# Patient Record
Sex: FEMALE | Race: WHITE | NOT HISPANIC OR LATINO | Employment: STUDENT | URBAN - METROPOLITAN AREA
[De-identification: names, ages, dates, MRNs, and addresses within clinical notes are randomized per-mention and may not be internally consistent; named-entity substitution may affect disease eponyms.]

---
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
## 2022-12-07 ENCOUNTER — OFFICE VISIT (OUTPATIENT)
Dept: OBGYN CLINIC | Facility: CLINIC | Age: 22
End: 2022-12-07

## 2022-12-07 VITALS
HEIGHT: 62 IN | WEIGHT: 230.2 LBS | HEART RATE: 116 BPM | BODY MASS INDEX: 42.36 KG/M2 | DIASTOLIC BLOOD PRESSURE: 127 MMHG | SYSTOLIC BLOOD PRESSURE: 159 MMHG

## 2022-12-07 DIAGNOSIS — Z30.09 BIRTH CONTROL COUNSELING: ICD-10-CM

## 2022-12-07 DIAGNOSIS — Z12.39 ENCOUNTER FOR BREAST CANCER SCREENING USING NON-MAMMOGRAM MODALITY: ICD-10-CM

## 2022-12-07 DIAGNOSIS — Z01.419 WOMEN'S ANNUAL ROUTINE GYNECOLOGICAL EXAMINATION: Primary | ICD-10-CM

## 2022-12-07 DIAGNOSIS — Z11.3 SCREEN FOR STD (SEXUALLY TRANSMITTED DISEASE): ICD-10-CM

## 2022-12-07 DIAGNOSIS — Z59.41 FOOD INSECURITY: ICD-10-CM

## 2022-12-07 DIAGNOSIS — Z59.9 FINANCIAL DIFFICULTIES: ICD-10-CM

## 2022-12-07 SDOH — ECONOMIC STABILITY - INCOME SECURITY: PROBLEM RELATED TO HOUSING AND ECONOMIC CIRCUMSTANCES, UNSPECIFIED: Z59.9

## 2022-12-07 SDOH — ECONOMIC STABILITY - FOOD INSECURITY: FOOD INSECURITY: Z59.41

## 2022-12-07 NOTE — PROGRESS NOTES
Javier Currie is a 23 y o  female who presents today for a new patient annual GYN exam  She has not yet had her first pap smear due to age  She has received complete HPV vaccine series  She reports she was diagnosed with PCOS at the age of 17 after a few years of oligomenorrhea, hirsutism and thinning of scalp hair  She reports menses over the past few months have been very regular, typically menses are every few months  Patient's last menstrual period was 2022 (approximate)  She reports she became sexually active for the first time a few months ago and she would like contraception  Her general medical history has been reviewed and she reports it as follows:    Past Medical History:   Diagnosis Date   • ADHD    • Anxiety    • Asthma    • Depression    • Migraine    • PCOS (polycystic ovarian syndrome) 2020     Past Surgical History:   Procedure Laterality Date   • ABDOMINAL WALL SURGERY      as a kid     OB History        0    Para   0    Term   0       0    AB   0    Living   0       SAB   0    IAB   0    Ectopic   0    Multiple   0    Live Births   0               Social History     Tobacco Use   • Smoking status: Never   • Smokeless tobacco: Never   Vaping Use   • Vaping Use: Never used   Substance Use Topics   • Alcohol use: Yes     Comment: Ocassionaly   • Drug use: Yes     Types: Marijuana     Comment: once a week     Social History     Substance and Sexual Activity   Sexual Activity Yes   • Partners: Male   • Birth control/protection: Condom     Cancer-related family history is negative for Breast cancer, Colon cancer, and Ovarian cancer      Current Outpatient Medications   Medication Instructions   • albuterol (PROVENTIL HFA,VENTOLIN HFA) 90 mcg/act inhaler 2 puffs, Inhalation, Every 6 hours PRN   • buPROPion (WELLBUTRIN XL) 150 mg 24 hr tablet TAKE 1 TABLET BY MOUTH EVERY DAY IN THE MORNING   • sertraline (ZOLOFT) 50 mg tablet 1 tablet, Oral, Daily       Review of Systems:  Review of Systems   Constitutional: Negative  Gastrointestinal: Negative  Genitourinary: Negative  Skin: Negative  Physical Exam:  BP (!) 159/127 (BP Location: Right arm, Patient Position: Sitting, Cuff Size: Adult)   Pulse (!) 116   Ht 5' 2" (1 575 m)   Wt 104 kg (230 lb 3 2 oz)   LMP 11/17/2022 (Approximate)   BMI 42 10 kg/m²   Physical Exam  Constitutional:       General: She is not in acute distress  Appearance: Normal appearance  Genitourinary:      Vulva and bladder normal    Breasts:     Right: No mass, nipple discharge or skin change  Left: No mass, nipple discharge or skin change  Cardiovascular:      Rate and Rhythm: Normal rate and regular rhythm  Pulmonary:      Effort: Pulmonary effort is normal       Breath sounds: Normal breath sounds  Abdominal:      General: Abdomen is flat  Palpations: Abdomen is soft  Musculoskeletal:      Cervical back: Neck supple  Neurological:      Mental Status: She is alert  Skin:     General: Skin is warm and dry  Psychiatric:         Mood and Affect: Mood is anxious  Behavior: Behavior normal    Vitals reviewed  Assessment/Plan:   1  Normal well-woman GYN exam   2  Cervical cancer screening:  Discussed pap screenings to start at age 26    1  STD screening:  Patient declines  4  Breast cancer screening:  Normal breast exam   Reviewed breast self-awareness  5  Depression Screening: Patient's depression screening was assessed with a PHQ-9 score was 11  Continue regular follow-up with their psychologist/therapist/psychiatrist who is managing their mental health condition(s)  Referral placed for  consultation  6  BMI Counseling: Body mass index is 42 1 kg/m²  Discussed the patient's BMI with her   The BMI is above normal  Nutrition recommendations include reducing portion sizes, decreasing overall calorie intake, 3-5 servings of fruits/vegetables daily, consuming healthier snacks, moderation in carbohydrate intake and increasing intake of lean protein  Exercise recommendations include moderate aerobic physical activity for 150 minutes/week and exercising 3-5 times per week  7  Contraception:  Education given regarding options for contraception, including barrier methods, injectable contraception, IUD placement, oral contraceptives, nuvaring, nexplanon and birth control patch  She would like LARC  Reviewed options  She would like to have a Mirena  Reviewed insertion/removal procedure, risks/benefits and expected bleeding patterns  Declines pre-procedure cytotec  Will use OTC Motrin  Stressed importance of condom use until procedure  8  Discussed concern for elevated BP  She reports extreme anxiety  Repeat BP not improved  Will schedule follow up with PCP  9  Return to office for IUD insertion  Reviewed with patient that [facility], but that they will not necessarily be reviewed by me immediately  Explained that I will review results at my earliest opportunity and contact patient appropriately

## 2022-12-08 ENCOUNTER — PATIENT OUTREACH (OUTPATIENT)
Dept: OBGYN CLINIC | Facility: CLINIC | Age: 22
End: 2022-12-08

## 2022-12-14 ENCOUNTER — PATIENT OUTREACH (OUTPATIENT)
Dept: OBGYN CLINIC | Facility: CLINIC | Age: 22
End: 2022-12-14

## 2022-12-14 ENCOUNTER — TELEPHONE (OUTPATIENT)
Dept: OBGYN CLINIC | Facility: CLINIC | Age: 22
End: 2022-12-14

## 2022-12-14 NOTE — TELEPHONE ENCOUNTER
Lvm for patient in regards to the specialty pharmacy trying to reach out in regards to her Mirena device claim  She would need to update her coordination of benefits with her insurance to process the claim  Given office call back number if needed

## 2022-12-21 ENCOUNTER — PATIENT OUTREACH (OUTPATIENT)
Dept: OBGYN CLINIC | Facility: CLINIC | Age: 22
End: 2022-12-21

## 2023-01-27 ENCOUNTER — NURSE TRIAGE (OUTPATIENT)
Dept: OTHER | Facility: OTHER | Age: 23
End: 2023-01-27

## 2023-01-27 NOTE — TELEPHONE ENCOUNTER
Reason for Disposition  • [1] Caller has URGENT question AND [2] triager unable to answer question    Answer Assessment - Initial Assessment Questions  1  SYMPTOM: "What's the main symptom you're concerned about?" (e g , pain, fever, vomiting)  Pain  2  ONSET: "When did pain  start?"  Today   3  SURGERY: "What surgery was performed?"  4 wisdom teeth removed   4  DATE of SURGERY: "When was surgery performed?"    1/27/2023  5  ANESTHESIA: " What type of anesthesia did you have?" (e g , general, spinal, epidural, local)   N/A  6  PAIN: "Is there any pain?" If Yes, ask: "How bad is it?"  (Scale 1-10; or mild, moderate, severe)  6/10  7  FEVER: "Do you have a fever?" If Yes, ask: "What is your temperature, how was it measured, and when did it start?"  Denies  8  VOMITING: "Is there any vomiting?" If yes, ask: "How many times?"  Denies  9  BLEEDING: "Is there any bleeding?" If Yes, ask: "How much?" and "Where?"  Denies   10   OTHER SYMPTOMS: "Do you have any other symptoms?" (e g , drainage from wound, painful urination, constipation)  Denies    Reported she took the ibuprofen about 30 mins ago    Protocols used: POST-OP SYMPTOMS AND QUESTIONS-[facility]

## 2023-01-27 NOTE — TELEPHONE ENCOUNTER
Regarding: OMS/ Medication dispensing Issues  ----- Message from Loco Parks sent at 1/27/2023  4:04 PM EST -----  "My niece had oral surgery and pain meds were sent to her [facility] pharmacy, But, the pharmacy just informed me that there's a shortage of this drug  I need a different med to be sent to her or for her prescription to be sent to a different pharmacy"      On call provider made aware and reported he will call the patient to discuss  Patients Aunt made aware the provider will be reaching out tonight to discuss in a few hours   Verbalized understanding

## 2023-06-14 ENCOUNTER — TELEPHONE (OUTPATIENT)
Dept: PSYCHIATRY | Facility: CLINIC | Age: 23
End: 2023-06-14

## 2023-06-14 NOTE — TELEPHONE ENCOUNTER
Patient has been added to the Medication Management wait list without a referral     Insurance: [employer]  Insurance Type:    Commercial []   Medicaid [x]   [place] [employer]   Alfred Ames []  [place] Pre[email or web address]  Provider Preference: none  Virtual: Yes [] No [x]    Patient provided verbal consent to speak with her aunt, Delmus Crigler, in the future if she were to be unavailable

## 2023-07-10 ENCOUNTER — OFFICE VISIT (OUTPATIENT)
Dept: OBGYN CLINIC | Facility: CLINIC | Age: 23
End: 2023-07-10

## 2023-07-10 VITALS
HEART RATE: 120 BPM | WEIGHT: 246.4 LBS | SYSTOLIC BLOOD PRESSURE: 171 MMHG | BODY MASS INDEX: 45.34 KG/M2 | DIASTOLIC BLOOD PRESSURE: 135 MMHG | HEIGHT: 62 IN | RESPIRATION RATE: 18 BRPM

## 2023-07-10 DIAGNOSIS — Z30.41 SURVEILLANCE OF CONTRACEPTIVE PILL: ICD-10-CM

## 2023-07-10 PROCEDURE — 99213 OFFICE O/P EST LOW 20 MIN: CPT | Performed by: NURSE PRACTITIONER

## 2023-07-10 RX ORDER — BUSPIRONE HYDROCHLORIDE 15 MG/1
15 TABLET ORAL 2 TIMES DAILY
COMMUNITY
Start: 2023-05-15

## 2023-07-10 RX ORDER — AMITRIPTYLINE HYDROCHLORIDE 10 MG/1
TABLET, FILM COATED ORAL
COMMUNITY
Start: 2023-06-12

## 2023-07-10 RX ORDER — ACETAMINOPHEN AND CODEINE PHOSPHATE 120; 12 MG/5ML; MG/5ML
1 SOLUTION ORAL DAILY
Qty: 28 TABLET | Refills: 11 | Status: SHIPPED | OUTPATIENT
Start: 2023-07-10

## 2023-07-10 NOTE — PROGRESS NOTES
PROBLEM GYNECOLOGICAL VISIT    Pedrito Solorio is a 26 y.o. female who presents today for birth control check up. Her general medical history has been reviewed and she reports it as follows:    Past Medical History:   Diagnosis Date   • ADHD    • Anxiety    • Asthma    • Depression    • Migraine    • PCOS (polycystic ovarian syndrome) 2020     Past Surgical History:   Procedure Laterality Date   • ABDOMINAL WALL SURGERY      as a kid   • GASTROSCHISIS CLOSURE     • WISDOM TOOTH EXTRACTION Bilateral 2023     OB History        0    Para   0    Term   0       0    AB   0    Living   0       SAB   0    IAB   0    Ectopic   0    Multiple   0    Live Births   0               Social History     Tobacco Use   • Smoking status: Never   • Smokeless tobacco: Never   Vaping Use   • Vaping Use: Never used   Substance Use Topics   • Alcohol use: Yes     Comment: Ocassionaly   • Drug use: Yes     Types: Marijuana     Comment: once a week     Social History     Substance and Sexual Activity   Sexual Activity Yes   • Partners: Male   • Birth control/protection: Condom, Pill       Current Outpatient Medications   Medication Instructions   • albuterol (PROVENTIL HFA,VENTOLIN HFA) 90 mcg/act inhaler 2 puffs, Inhalation, Every 6 hours PRN   • amitriptyline (ELAVIL) 10 mg tablet TAKE 1 TABLET BY MOUTH EVERY DAY AT NIGHT   • buPROPion (WELLBUTRIN XL) 150 mg 24 hr tablet TAKE 1 TABLET BY MOUTH EVERY DAY IN THE MORNING   • busPIRone (BUSPAR) 15 mg, Oral, 2 times daily   • norethindrone (NAJMA) 0.35 mg, Oral, Daily   • sertraline (ZOLOFT) 50 mg tablet 1 tablet, Daily       History of Present Illness:   [facility] presents today for an OCP check up. She was started on POPs for contraception and for endometrial protection due to a history of PCOS about 3 months ago. She reports no complaints and is feeling well on OCPs. She is doing well with every 24 hour dosing.  She does report irregular bleeding: had no bleeding one month, had spotting only one month and this month is having normal menstrual like bleeding. Review of Systems:  Review of Systems   Constitutional: Negative. Gastrointestinal: Negative. Genitourinary: Negative. Physical Exam:  BP (!) 171/135 (BP Location: Right arm, Patient Position: Sitting, Cuff Size: Large)   Pulse (!) 120   Resp 18   Ht 5' 2" (1.575 m)   Wt 112 kg (246 lb 6.4 oz)   LMP 07/04/2023 (Exact Date)   BMI 45.07 kg/m²   Physical Exam  Constitutional:       General: She is not in acute distress. Appearance: Normal appearance. Neurological:      Mental Status: She is alert. Skin:     General: Skin is warm and dry. Psychiatric:         Mood and Affect: Mood normal.         Behavior: Behavior normal.   Vitals reviewed. Assessment:   1. Surveillance of contraceptive pill    Plan:   1. Doing well on OCPs. Refills approved x1 year. 2. Discussed concerns for BP. She reports anxiety about medical appointment, she does monitor her BP at home and follows with a PCP. 3. Will return for annual exam with first pap. Reviewed with patient that [facility], but that they will not necessarily be reviewed by me immediately. Explained that I will review results at my earliest opportunity and contact patient appropriately.

## 2023-10-20 ENCOUNTER — TELEPHONE (OUTPATIENT)
Dept: OBGYN CLINIC | Facility: CLINIC | Age: 23
End: 2023-10-20

## 2023-10-20 NOTE — TELEPHONE ENCOUNTER
----- Message from Malcom Mcgee sent at 10/19/2023 11:39 AM EDT -----  Regarding: PCOS Management   Contact: [phone]  Hello! I wanted to reach out because between severe gender dysphorie, weight fluctuations, hair-thinning, and not ever truly understanding my period/spotting patterns- i feel completely lost. I know I mentioned potentially starting an anti-androgen medication through my endocrinologist, but he has been nothing short of apathetic and dismissive of my concerns. My last interaction with him, he mentioned starting birth control since the metformin didn’t work for me. It was an infuriating exchange because I felt like it should’ve only taken him a few seconds to see my chart blatantly states that I am on birth control. Although I haven’t had an issues with the birth control that I am currently on, his statement made me wonder if I should switch to one that might be a little more helpful with my symptoms? While I search for a new endocrinologist, my boyfriend mentioned HRT and wondering if it would be helpful with my symptoms or something that I would qualify for.

## 2023-10-20 NOTE — TELEPHONE ENCOUNTER
Attempted to call, left a message asking for patient to call the office. Office number provided in message.

## 2023-10-30 ENCOUNTER — TELEPHONE (OUTPATIENT)
Dept: PSYCHIATRY | Facility: CLINIC | Age: 23
End: 2023-10-30

## 2023-11-15 ENCOUNTER — TELEPHONE (OUTPATIENT)
Dept: OBGYN CLINIC | Facility: CLINIC | Age: 23
End: 2023-11-15

## 2023-11-15 NOTE — PROGRESS NOTES
OB/GYN VISIT  [facility] VISHNU COMBS  2023  2:28 PM    Subjective:     Edison Kaur is a 26 y.o.  female who presents for problem visit for vaginal irritation. She reports it felt a little bit more dry vaginally than usual and she has had some itching for the past week which has started clearing up on her own. She did not try Monistat or anything over-the-counter. Currently taking norenthindrone (Patricia) for birth control. Blood pressure was 174/96 today, reports whitecoat hypertension and her PCP had her take her blood pressure at home for a month and they were all normal at home. She reports she does not get  regular cycles since starting birth control although she will occasionally get spotting. Chart review shows persistently elevated blood pressure in office settings (159/127, 153/102, 171/135). She has never had a pelvic exam before  Past Medical History:   Diagnosis Date    ADHD     Anxiety     Asthma     Depression     Migraine     PCOS (polycystic ovarian syndrome) 2020     Past Surgical History:   Procedure Laterality Date    ABDOMINAL WALL SURGERY      as a kid    GASTROSCHISIS CLOSURE      WISDOM TOOTH EXTRACTION Bilateral 2023       Objective:    Vitals: Blood pressure (!) 174/96, pulse 95, height 5' 2" (1.575 m), weight 109 kg (241 lb), last menstrual period 2023. Body mass index is 44.08 kg/m². Physical Exam  Genitourinary:     Comments: Normal-appearing external female genitalia, nulliparous vagina, nulliparous cervix. Minimal discharge noted. Some cervical mucus noted and some old blood at cervical os.         Assessment/Plan:    Vaginal Itching  - Vaginitis likely cleared on its own  - Wet mount/KOH prep negative  - Discussed vulvar hygiene & common culprits for recurrent vaginitis  - Did very well with first pelvic exam, return to office for annual exam    Problem List Items Addressed This Visit    None  Visit Diagnoses       Acute vaginitis    -  Primary D/w Dr. Rupali Paz, DO  11/16/2023  2:28 PM        Please note that while the recent CURES Act permits medical records be visible for patient review, clinical documentation is intended for utilization by healthcare professionals. All test results are immediately available through [employer] as well, and we will review results at earliest opportunity and contact you with the appropriate urgency. If you have a question about something you see in your chart, please don't hesitate to ask about it at your next appointment.

## 2023-11-15 NOTE — TELEPHONE ENCOUNTER
Lvm for patient in regards to helping schedule appointment with office. Given office call back number.

## 2023-11-16 ENCOUNTER — OFFICE VISIT (OUTPATIENT)
Dept: OBGYN CLINIC | Facility: CLINIC | Age: 23
End: 2023-11-16

## 2023-11-16 VITALS
DIASTOLIC BLOOD PRESSURE: 96 MMHG | WEIGHT: 241 LBS | HEART RATE: 95 BPM | HEIGHT: 62 IN | SYSTOLIC BLOOD PRESSURE: 174 MMHG | BODY MASS INDEX: 44.35 KG/M2

## 2023-11-16 DIAGNOSIS — N76.0 ACUTE VAGINITIS: Primary | ICD-10-CM

## 2023-11-16 LAB
BV WHIFF TEST VAG QL: NORMAL
CLUE CELLS SPEC QL WET PREP: NORMAL
T VAGINALIS VAG QL WET PREP: NORMAL
YEAST VAG QL WET PREP: NORMAL

## 2023-11-16 RX ORDER — EPINEPHRINE 0.3 MG/.3ML
INJECTION SUBCUTANEOUS
COMMUNITY
Start: 2023-09-19

## 2023-11-16 RX ORDER — FLUVOXAMINE MALEATE 25 MG
25 TABLET ORAL
COMMUNITY
Start: 2023-11-06 | End: 2023-12-06

## 2023-11-16 RX ORDER — CLONAZEPAM 0.5 MG/1
0.5 TABLET ORAL DAILY PRN
COMMUNITY
Start: 2023-11-06 | End: 2023-12-06

## 2024-07-23 DIAGNOSIS — Z30.41 SURVEILLANCE OF CONTRACEPTIVE PILL: ICD-10-CM

## 2024-07-23 RX ORDER — ACETAMINOPHEN AND CODEINE PHOSPHATE 120; 12 MG/5ML; MG/5ML
1 SOLUTION ORAL DAILY
Qty: 28 TABLET | Refills: 1 | Status: SHIPPED | OUTPATIENT
Start: 2024-07-23

## 2024-09-29 DIAGNOSIS — Z30.41 SURVEILLANCE OF CONTRACEPTIVE PILL: ICD-10-CM

## 2024-09-30 RX ORDER — ACETAMINOPHEN AND CODEINE PHOSPHATE 120; 12 MG/5ML; MG/5ML
1 SOLUTION ORAL DAILY
Qty: 28 TABLET | Refills: 1 | Status: SHIPPED | OUTPATIENT
Start: 2024-09-30

## 2024-10-01 ENCOUNTER — TELEPHONE (OUTPATIENT)
Dept: OBGYN CLINIC | Facility: CLINIC | Age: 24
End: 2024-10-01

## 2024-10-01 NOTE — TELEPHONE ENCOUNTER
Lvm for patient to advise 2 birth control refills were sent to pharmacy. Advised for continued management she would have to schedule an annual visit with office as she is overdue. Given office call back number to contact.

## 2024-11-27 ENCOUNTER — TELEPHONE (OUTPATIENT)
Dept: OBGYN CLINIC | Facility: CLINIC | Age: 24
End: 2024-11-27

## 2024-11-27 DIAGNOSIS — Z30.41 SURVEILLANCE OF CONTRACEPTIVE PILL: ICD-10-CM

## 2024-11-27 RX ORDER — ACETAMINOPHEN AND CODEINE PHOSPHATE 120; 12 MG/5ML; MG/5ML
1 SOLUTION ORAL DAILY
Qty: 28 TABLET | Refills: 0 | Status: SHIPPED | OUTPATIENT
Start: 2024-11-27

## 2024-11-27 NOTE — TELEPHONE ENCOUNTER
Second attempt to contact patient in regards to management of birth control. Was previously sent two refills, she is overdue for a well visit in office. Patient was sent one last birth control refill and given instructions to call office to schedule appointment for continued management.

## 2025-01-29 ENCOUNTER — ANNUAL EXAM (OUTPATIENT)
Dept: OBGYN CLINIC | Facility: CLINIC | Age: 25
End: 2025-01-29

## 2025-01-29 VITALS
SYSTOLIC BLOOD PRESSURE: 140 MMHG | DIASTOLIC BLOOD PRESSURE: 74 MMHG | HEIGHT: 62 IN | BODY MASS INDEX: 41.7 KG/M2 | WEIGHT: 226.6 LBS

## 2025-01-29 DIAGNOSIS — Z11.3 SCREENING FOR STDS (SEXUALLY TRANSMITTED DISEASES): ICD-10-CM

## 2025-01-29 DIAGNOSIS — Z30.41 SURVEILLANCE OF CONTRACEPTIVE PILL: ICD-10-CM

## 2025-01-29 DIAGNOSIS — Z01.419 ENCOUNTER FOR GYNECOLOGICAL EXAMINATION WITHOUT ABNORMAL FINDING: Primary | ICD-10-CM

## 2025-01-29 DIAGNOSIS — E66.01 MORBID OBESITY WITH BMI OF 40.0-44.9, ADULT (HCC): ICD-10-CM

## 2025-01-29 PROCEDURE — 99395 PREV VISIT EST AGE 18-39: CPT | Performed by: OBSTETRICS & GYNECOLOGY

## 2025-01-29 PROCEDURE — 87591 N.GONORRHOEAE DNA AMP PROB: CPT

## 2025-01-29 PROCEDURE — 87491 CHLMYD TRACH DNA AMP PROBE: CPT

## 2025-01-29 PROCEDURE — G0145 SCR C/V CYTO,THINLAYER,RESCR: HCPCS

## 2025-01-29 RX ORDER — CLONAZEPAM 0.5 MG/1
TABLET ORAL
COMMUNITY
Start: 2023-11-06

## 2025-01-29 RX ORDER — PRAZOSIN HYDROCHLORIDE 1 MG/1
CAPSULE ORAL
COMMUNITY
Start: 2024-10-30

## 2025-01-29 RX ORDER — PROPRANOLOL HCL 20 MG
20 TABLET ORAL 4 TIMES DAILY
COMMUNITY
Start: 2024-04-16 | End: 2025-12-18

## 2025-01-29 RX ORDER — ACETAMINOPHEN AND CODEINE PHOSPHATE 120; 12 MG/5ML; MG/5ML
1 SOLUTION ORAL DAILY
Qty: 28 TABLET | Refills: 11 | Status: SHIPPED | OUTPATIENT
Start: 2025-01-29

## 2025-01-29 RX ORDER — FLUOXETINE 40 MG/1
80 CAPSULE ORAL DAILY
COMMUNITY
Start: 2024-12-18 | End: 2026-01-27

## 2025-01-29 RX ORDER — PRAZOSIN HYDROCHLORIDE 2 MG/1
2 CAPSULE ORAL DAILY
COMMUNITY
Start: 2024-12-18

## 2025-01-29 NOTE — ASSESSMENT & PLAN NOTE
1.  Routine well woman exam done today.  2.  Pap and HPV:Pap with reflex to HPV if abnormal was done today.  Current ASCCP Guidelines reviewed.   3.  The patient accepted STD testing.  chlamydia, gonorrhea, HIV, and Hep C, Hep B, and RPR testing performed. Safe sex practices have been discussed.  4. The patient is sexually active. She is using micronor for contraception. Not using condoms  5. The following were reviewed in today's visit: breast self exam, exercise, and healthy diet.  6. Patient to return to office in 12 months for annual exam or sooner if needed.   Orders:    Liquid-based pap, screening

## 2025-01-29 NOTE — PATIENT INSTRUCTIONS
"Patient Education     Diet and health   The Basics   Written by the doctors and editors at [facility]   Why is it important to eat a healthy diet? -- It's important to eat a healthy diet because eating the right foods can keep you healthy now and later in life. It can lower the risk of problems like heart disease, diabetes, high blood pressure, and some types of cancer. It can also help you live longer and improve your quality of life.  What kind of diet is best? -- There is no 1 specific diet that experts recommend for everyone. People choose what foods to eat for many different reasons. These include personal preference, culture, [place], allergies or intolerances, and nutritional goals. People also need to consider the cost and availability of different foods.  In general, experts recommend a diet that:   Includes lots of vegetables, fruits, beans, nuts, and whole grains   Limits red and processed meats, unhealthy fats, sugar, salt, and alcohol  What are dietary patterns? -- A dietary \"pattern\" means generally eating certain types of foods while limiting others. Some people need to follow a specific dietary pattern because of their health needs. For example, if you have high blood pressure, your doctor might recommend a diet low in salt.  If you are trying to improve your health in general, choosing a healthy dietary pattern can help. This does not have to mean being very strict about what you eat or avoid. The goal is to think about getting plenty of healthy foods while limiting less healthy foods.  Examples of dietary patterns include:   Mediterranean diet - This involves eating a lot of fruits, vegetables, nuts, and whole grains, and uses olive oil instead of other fats. It also includes some fish, poultry, and dairy products, but not a lot of red meat. Following this diet can help your overall health, and might even lower your risk of having a stroke.   Plant-based diets - These patterns focus on vegetables, " "fruits, grains, beans, and nuts. They limit or avoid food that comes from animals, such as meat and dairy. There are different types of plant-based diets, including vegetarian and vegan.   Low-fat diet - A low-fat diet involves limiting calories from fat. This might help some people keep weight off if that is their goal, but it does not have many other health benefits. If you choose to follow a low-fat diet, it is also important to focus on getting lots of whole grains, legumes, fruits, and vegetables. Limit refined grains and sugar.   Low-cholesterol diet - Cholesterol is found in foods with a lot of saturated fat, like red meat, butter, and cheese. A low-cholesterol diet focuses on limiting the amount of cholesterol that you eat. Limiting the cholesterol in your diet can also help lower the amount of unhealthy fats that you eat.  Which foods are especially healthy? -- Foods that are especially healthy include:   Fruits and vegetables - Eating a diet with lots of fruits and vegetables can help prevent heart disease and stroke. It might also help prevent certain types of cancer. Try to eat fruits and vegetables at each meal and also for snacks. If you don't have fresh fruits and vegetables available, you can eat frozen or canned ones instead. Doctors recommend eating at least 5 servings of fruits or vegetables each day.   Whole grains - Whole-grain foods include 100 percent whole-wheat bread, steel cut oats, and whole-grain pasta. These are healthier than foods made with \"refined\" grains, like white bread and white rice. Eating lots of whole grains instead of refined grains has been shown to help with weight control. It can also lower the risk of several health problems, including colon cancer, heart disease, and diabetes. Doctors recommend that most people try to eat 5 to 8 servings of whole-grain, high-fiber foods each day.   Foods with fiber - Eating foods with a lot of fiber can help prevent heart disease and " "stroke. If you have type 2 diabetes, it can also help control your blood sugar. Foods that have a lot of fiber include vegetables, fruits, beans, nuts, oatmeal, and whole-grain breads and cereals. You can tell how much fiber is in a food by reading the nutrition label (figure 1). Doctors recommend that most people eat about 25 to 34 grams of fiber each day.   Foods with calcium and vitamin D - Babies, children, and adults need calcium and vitamin D to help keep their bones strong. Adults also need calcium and vitamin D to help prevent osteoporosis. Osteoporosis is a condition that causes bones to get \"thin\" and break more easily than usual. Different foods and drinks have calcium and vitamin D in them (figure 2). People who don't get enough calcium and vitamin D in their diet might need to take a supplement. Doctors recommend that most people have 2 to 3 servings of foods with calcium and vitamin D each day.   Foods with protein - Protein helps your muscles and bones stay strong. Healthy foods with a lot of protein include chicken, fish, eggs, beans, nuts, and soy products. Red meat also has a lot of protein, but it also contains fats, which can be unhealthy. Doctors recommend that most people try to eat about 5 servings of protein each day.   Healthy fats - There are different types of fats. Some types of fats are better for your body than others. Healthy fats are \"monounsaturated\" or \"polyunsaturated\" fats. These are found in fatty fish, nuts and nut butters, and avocados. Use plant-based oils when cooking. Examples of these oils include olive, canola, safflower, sunflower, and corn oil. Eating foods with healthy fats, while avoiding or limiting foods with unhealthy fats, might lower the risk of heart disease.   Foods with folate - Folate is a vitamin that is important for pregnant people, since it helps prevent certain birth defects. It is also called \"folic acid.\" Anyone who could get pregnant should get at " "least 400 micrograms of folic acid daily, whether or not they are actively trying to get pregnant. Folate is found in many breakfast cereals, oranges, orange juice, and green leafy vegetables.  What foods should I avoid or limit? -- To eat a healthy diet, there are some things that you should avoid or limit. They include:   Unhealthy fats - \"Trans\" fats are especially unhealthy. They are found in margarines, many fast foods, and some store-bought baked goods. \"Saturated\" fats are found in animal products like meats, egg yolks, butter, cheese, and full-fat milk products. Unhealthy fats can raise your cholesterol level and increase your chance of getting heart disease.   Sugar - To have a healthy diet, it's important to limit or avoid added sugar, sweets, and refined grains. Refined grains are found in white bread, white rice, most pastas, and most packaged \"snack\" foods.  Avoiding sugar-sweetened beverages, like soda and sports drinks, can also help improve your health.  Avoid canned fruits in \"heavy\" syrup.   Red and processed meats - Studies have shown that eating a lot of red meat can increase your risk of certain health problems, including heart disease and cancer. You should limit the amount of red meat that you eat. This is also true for processed meats like sausage, hot dogs, and leyva.  Can I drink alcohol as part of a healthy diet? -- Not drinking alcohol at all is the healthiest choice. Regular drinking can raise a person's chances of getting liver disease and certain types of cancers. In females, even 1 drink a day can increase the risk of getting breast cancer.  If you do choose to drink, most doctors recommend limiting alcohol to no more than:   1 drink a day for females   2 drinks a day for males  The limits are different because, generally, the female body takes longer to break down alcohol.  How many calories do I need each day? -- Calories give your body energy. The number of calories that you need " "each day depends on your weight, height, age, sex, and how active you are.  Your doctor or nurse can tell you about how many calories you should eat each day. You can also work with a dietitian (nutrition expert) to learn more about your dietary needs and options.  What if I am having trouble improving my diet? -- It can be hard to change the way that you eat. Remember that even small changes can improve your health.  Here are some tips that might help:   Try to make fruits and vegetables part of every meal. If you don't have fresh fruits and vegetables, frozen or canned are good options. Look for products without added salt or sugar.   Keep a bowl of fruit out for snacking.   When you can, choose whole grains instead of refined grains. Choose chicken, fish, and beans instead of red meat and cheese.   Try to eat prepared and processed foods less often.   Try flavored seltzer or water instead of soda or juice.   When eating at fast food restaurants, look for healthier items, like broiled chicken or salad.  If you have questions about which foods you should or should not eat, ask your doctor, nurse, or dietitian. The right diet for you will depend, in part, on your health and any medical conditions you have.  All topics are updated as new evidence becomes available and our peer review process is complete.  This topic retrieved from [employer] on: Feb 28, 2024.  Topic 94054 Version 28.0  Release: 32.2.4 - C32.58  © 2024 UpToDate, Inc. and/or its affiliates. All rights reserved.  figure 1: Nutrition label - Fiber     This is an example of a nutrition label. To figure out how much fiber is in a food, look for the line that says \"Dietary Fiber.\" It's also important to look at the serving size. This food has 7 grams of fiber in each serving, and each serving is 1 cup.  Graphic 63682 Version 8.0  figure 2: Foods and drinks with calcium and vitamin D     Foods rich in calcium include ice cream, soy milk, breads, kale, " "broccoli, milk, cheese, cottage cheese, almonds, yogurt, ready-to-eat cereals, beans, and tofu. Foods rich in vitamin D include milk, fortified plant-based \"milks\" (soy, almond), canned tuna fish, cod liver oil, yogurt, ready-to-eat-cereals, cooked salmon, canned sardines, mackerel, and eggs. Some of these foods are rich in both.  Graphic 29552 Version 4.0  Consumer Information Use and Disclaimer   Disclaimer: This generalized information is a limited summary of diagnosis, treatment, and/or medication information. It is not meant to be comprehensive and should be used as a tool to help the user understand and/or assess potential diagnostic and treatment options. It does NOT include all information about conditions, treatments, medications, side effects, or risks that may apply to a specific patient. It is not intended to be medical advice or a substitute for the medical advice, diagnosis, or treatment of a health care provider based on the health care provider's examination and assessment of a patient's specific and unique circumstances. Patients must speak with a health care provider for complete information about their health, medical questions, and treatment options, including any risks or benefits regarding use of medications. This information does not endorse any treatments or medications as safe, effective, or approved for treating a specific patient. UpToDate, Inc. and its affiliates disclaim any warranty or liability relating to this information or the use thereof.The use of this information is governed by the Terms of Use, available at https://www.wolterskluwer.com/en/know/clinical-effectiveness-terms. 2024© UpToDate, Inc. and its affiliates and/or licensors. All rights reserved.  Copyright   © 2024 UpToDate, Inc. and/or its affiliates. All rights reserved.    "

## 2025-01-29 NOTE — PROGRESS NOTES
Subjective      Tesha Mcgee is a 24 y.o. female who presents for annual GYN exam.     GYN:  Currently on Micronor for PCOS and not having menses. Reports occasional spotting.  Prior to starting micronor, menses were irregular  Would bleed for 4-8 days    Denies vaginal discharge, labial erythema or lesions, dyspareunia.  Contraception: POPs.  Patient is sexually active with 1 male partner.  Denies gynecologic surgeries.    OB:  OB History    Para Term  AB Living   0 0 0 0 0 0   SAB IAB Ectopic Multiple Live Births   0 0 0 0 0       :  Denies dysuria, urinary frequency or urgency.  Denies hematuria, flank pain, incontinence.    Breast:  Denies breast mass, skin changes, dimpling, reddening, nipple retraction.  Has had more acne on breasts, attributing to bra  Denies breast discharge.  Patient does not have a family history of breast, endometrial, colon, or ovarian ca.     Cancer-related family history is negative for Breast cancer, Colon cancer, and Ovarian cancer.    Past Medical History:   Diagnosis Date    ADHD     Anxiety     Asthma     Depression     Migraine     PCOS (polycystic ovarian syndrome) 2020       Past Surgical History:   Procedure Laterality Date    ABDOMINAL WALL SURGERY      as a kid    GASTROSCHISIS CLOSURE      WISDOM TOOTH EXTRACTION Bilateral 2023       General:  Diet: 2 meals daily, well balanced with meat, fruits and vegetables.  Exercise: on feet daily at work, has not exercised since  Work:  at hand and [employer] and works at [employer]  Lives with aunt uncle and 2 cousins, feels safe at home    Social History     Tobacco Use    Smoking status: Never    Smokeless tobacco: Never   Vaping Use    Vaping status: Never Used   Substance Use Topics    Alcohol use: Yes     Comment: Ocassionaly    Drug use: Yes     Types: Marijuana     Comment: once a week       Screening:  Cervical cancer: First pap smear completed today. Patient has received 3  "doses of Gardasil vaccine.   Breast cancer: due for first screening at age 40  Colon cancer: due for first screening at age 45  STD screening: accepts today, concerned about cost.    Review of Systems   Constitutional:  Negative for appetite change, chills, fatigue and fever.   Respiratory:  Negative for cough, chest tightness, shortness of breath and wheezing.    Cardiovascular:  Negative for chest pain, palpitations and leg swelling.   Gastrointestinal:  Negative for abdominal distention, abdominal pain, blood in stool, constipation, diarrhea, nausea and vomiting.   Genitourinary:  Negative for difficulty urinating, pelvic pain, urgency and vaginal discharge.   Musculoskeletal:  Negative for arthralgias, joint swelling and myalgias.   Skin:         Acne on breasts   Allergic/Immunologic: Positive for food allergies (newly allergic to bananas).   Neurological:  Negative for dizziness and headaches.       Objective      /74 (BP Location: Left arm, Patient Position: Sitting, Cuff Size: Adult)   Ht 5' 2\" (1.575 m)   Wt 103 kg (226 lb 9.6 oz)   LMP 01/23/2025 (Exact Date)   BMI 41.45 kg/m²     Physical Exam  Constitutional:       General: She is not in acute distress.  Genitourinary:      Urethral meatus normal.      No lesions in the vagina.      Right Labia: No rash, tenderness or lesions.     Left Labia: No tenderness, lesions or rash.     No vaginal discharge, erythema, tenderness, bleeding or ulceration.        Right Adnexa: not tender, not full and no mass present.     Left Adnexa: not tender, not full and no mass present.     Cervix is nulliparous.      No cervical motion tenderness, discharge, friability, lesion or polyp.      Uterus is not fixed, tender or irregular.      No uterine mass detected.  Breasts:     Right: Normal. No inverted nipple, mass, nipple discharge, skin change or tenderness.      Left: Normal. No inverted nipple, mass, nipple discharge, skin change or tenderness.   HENT:      " Head: Normocephalic.      Nose: Nose normal.      Mouth/Throat:      Pharynx: Oropharynx is clear.   Eyes:      Conjunctiva/sclera: Conjunctivae normal.   Cardiovascular:      Rate and Rhythm: Normal rate.   Pulmonary:      Effort: Pulmonary effort is normal. No respiratory distress.   Abdominal:      General: There is no distension.      Palpations: Abdomen is soft.      Tenderness: There is no abdominal tenderness. There is no guarding.   Musculoskeletal:         General: No tenderness.   Lymphadenopathy:      Upper Body:      Right upper body: No supraclavicular or axillary adenopathy.      Left upper body: No supraclavicular or axillary adenopathy.   Neurological:      Mental Status: She is alert.   Skin:     General: Skin is warm and dry.      Coloration: Skin is not jaundiced.   Psychiatric:         Mood and Affect: Mood normal.         Behavior: Behavior normal.   Vitals reviewed.         Assessment/Plan  Assessment & Plan  Encounter for gynecological examination without abnormal finding  1.  Routine well woman exam done today.  2.  Pap and HPV:Pap with reflex to HPV if abnormal was done today.  Current ASCCP Guidelines reviewed.   3.  The patient accepted STD testing.  chlamydia, gonorrhea, HIV, and Hep C, Hep B, and RPR testing performed. Safe sex practices have been discussed.  4. The patient is sexually active. She is using micronor for contraception. Not using condoms  5. The following were reviewed in today's visit: breast self exam, exercise, and healthy diet.  6. Patient to return to office in 12 months for annual exam or sooner if needed.   Orders:    Liquid-based pap, screening    Screening for STDs (sexually transmitted diseases)    Orders:    HIV 1/2 AG/AB w Reflex [facility] for 2 yr old and above; Future    Hepatitis C antibody; Future    Hepatitis B surface antigen; Future    RPR-Syphilis Screening (Total Syphilis IGG/IGM); Future    Chlamydia/GC amplified DNA by PCR    Surveillance of contraceptive  pill    Orders:    norethindrone (MICRONOR) 0.35 MG tablet; Take 1 tablet (0.35 mg total) by mouth daily    Morbid obesity with BMI of 40.0-44.9, adult ([facility])  BMI Counseling: Body mass index is 41.45 kg/m². The BMI is above normal. Nutrition recommendations include 3-5 servings of fruits/vegetables daily, consuming healthier snacks, moderation in carbohydrate intake, and increasing intake of lean protein. Exercise recommendations include moderate aerobic physical activity for 150 minutes/week.      Elis Das MD   PGY-2, OBGYN  1/29/2025  12:03 PM

## 2025-01-31 LAB
C TRACH DNA SPEC QL NAA+PROBE: NEGATIVE
LAB AP GYN PRIMARY INTERPRETATION: NORMAL
Lab: NORMAL
N GONORRHOEA DNA SPEC QL NAA+PROBE: NEGATIVE

## 2025-02-01 ENCOUNTER — RESULTS FOLLOW-UP (OUTPATIENT)
Dept: LABOR AND DELIVERY | Facility: HOSPITAL | Age: 25
End: 2025-02-01

## 2025-02-28 PROBLEM — Z01.419 ENCOUNTER FOR GYNECOLOGICAL EXAMINATION WITHOUT ABNORMAL FINDING: Status: RESOLVED | Noted: 2025-01-29 | Resolved: 2025-02-28

## 2025-08-04 ENCOUNTER — TELEPHONE (OUTPATIENT)
Age: 25
End: 2025-08-04